# Patient Record
Sex: MALE | Race: ASIAN | NOT HISPANIC OR LATINO | Employment: OTHER | URBAN - METROPOLITAN AREA
[De-identification: names, ages, dates, MRNs, and addresses within clinical notes are randomized per-mention and may not be internally consistent; named-entity substitution may affect disease eponyms.]

---

## 2023-11-28 NOTE — TELEPHONE ENCOUNTER
Diagnosis, Referred by & from: Rectal Bleeding (Hematochezia)    Appt date: 12/15/2023   NOTES STATUS DETAILS   OFFICE NOTE from referring provider N/A    OFFICE NOTE from other specialist Care Everywhere [facility]:  3/22/23 - [facility] OV with Dr. Mcintyre   DISCHARGE SUMMARY from hospital N/A    DISCHARGE REPORT from the ER Care Everywhere [facility]:  11/24/23 - ED OV with Dr. Bah   OPERATIVE REPORT N/A    MEDICATION LIST Care Everywhere    LABS N/A    DIAGNOSTIC PROCEDURES N/A    IMAGING (DISC & REPORT)      CT Received [facility]:  11/25/23 - CT Abd/Pelvis     Records Requested  11/28/23    Facility  [facility]  Fax: [phone]   Outcome * 11/28/23 9:37 AM Faxed urgent request to Irene for images to be pushed to [facility]. - Sasha    * 11/29/23 6:51 AM Images received from [facility] and attached to the patient in PACs. - Sasha

## 2023-12-15 ENCOUNTER — OFFICE VISIT (OUTPATIENT)
Dept: SURGERY | Facility: CLINIC | Age: 68
End: 2023-12-15
Payer: MEDICARE

## 2023-12-15 ENCOUNTER — PRE VISIT (OUTPATIENT)
Dept: SURGERY | Facility: CLINIC | Age: 68
End: 2023-12-15

## 2023-12-15 VITALS — DIASTOLIC BLOOD PRESSURE: 77 MMHG | SYSTOLIC BLOOD PRESSURE: 125 MMHG | OXYGEN SATURATION: 99 % | HEART RATE: 67 BPM

## 2023-12-15 DIAGNOSIS — R19.5 DARK STOOLS: ICD-10-CM

## 2023-12-15 DIAGNOSIS — Z12.11 SCREEN FOR COLON CANCER: Primary | ICD-10-CM

## 2023-12-15 PROCEDURE — 99202 OFFICE O/P NEW SF 15 MIN: CPT

## 2023-12-15 RX ORDER — ASPIRIN 81 MG/1
81 TABLET, COATED ORAL DAILY
COMMUNITY

## 2023-12-15 RX ORDER — POTASSIUM CHLORIDE 1500 MG/1
20 TABLET, EXTENDED RELEASE ORAL DAILY
COMMUNITY

## 2023-12-15 RX ORDER — LISINOPRIL AND HYDROCHLOROTHIAZIDE 12.5; 2 MG/1; MG/1
1 TABLET ORAL DAILY
COMMUNITY

## 2023-12-15 RX ORDER — VERAPAMIL HYDROCHLORIDE 120 MG/1
TABLET, FILM COATED, EXTENDED RELEASE ORAL
COMMUNITY

## 2023-12-15 RX ORDER — LOVASTATIN 40 MG
40 TABLET ORAL
COMMUNITY
Start: 2023-05-02

## 2023-12-15 ASSESSMENT — PAIN SCALES - GENERAL: PAINLEVEL: NO PAIN (0)

## 2023-12-15 NOTE — PATIENT INSTRUCTIONS
You should get a call to schedule the colonoscopy. If you don t hear from a representative within 2 business days, please call [phone].     Start a daily fiber supplement such as Citrucel or Metamucil. Start with once a day. The powder version works best. These are commonly found at most stores over the counter.

## 2023-12-15 NOTE — PROGRESS NOTES
Colon and Rectal Surgery Consult Clinic Note    RE: Denia Pryor  : 1955  MANDO: 12/15/2023    Denia Pryor is a very pleasant 68 year old male here with concerns for melena.    Denia reports an episode of dark tarry stools the week after Thanksgiving. This happened for 1-2 days and concerned his son enough to bring him to the ED, where FOBT was negative. His son did not see these dark stools. His stools returned back to normal and he has not had any dark stools since then. No bright red blood per rectum. His baseline bowel movements are 2-3 times a day and he typically strains. Denies unintentional weight loss, loss of appetite, abdominal pain, change in bowel habits. No prior colonoscopy. No family history of colorectal cancer.    ong  declined; family interpreted.    Physical Examination:   /77 (BP Location: Left arm, Patient Position: Sitting, Cuff Size: Adult Large)   Pulse 67   SpO2 99%   General: alert, oriented, in no acute distress, sitting comfortably    Assessment/Plan: Denia Pryor is a 68 year old male with possible melena several weeks ago. No bright red blood per rectum. Discussed that there may be causes of dark stools that are not blood, such as iron supplements or Pepto-Bismol. It is reassuring that his FOBT was negative in the ED. We will plan for a colonoscopy for evaluate for other more proximal causes of bleeding. Patient's questions were answered to his stated satisfaction and he is in agreement with this plan.     Medical history:  No past medical history on file.    Surgical history:  No past surgical history on file.    Problem list:  There are no problems to display for this patient.      Medications:  Current Outpatient Medications   Medication Sig Dispense Refill    ASPIRIN LOW DOSE 81 MG EC tablet Take 81 mg by mouth daily      KLOR-CON 20 MEQ CR tablet Take 20 mEq by mouth daily      lisinopril-hydrochlorothiazide (ZESTORETIC) 20-12.5 MG tablet Take 1 tablet by mouth  daily      lovastatin (MEVACOR) 40 MG tablet Take 40 mg by mouth      verapamil ER (CALAN-SR) 120 MG CR tablet Take 1 Tablet (120 mg) by mouth every evening.*         Allergies:  No Known Allergies    Family history:  No family history on file.    Social history:  Social History     Tobacco Use    Smoking status: Never    Smokeless tobacco: Never   Substance Use Topics    Alcohol use: Not on file    Marital status: .    Nursing Notes:   Nelson Guardado, EMT  12/15/2023  3:37 PM  Signed  Chief Complaint   Patient presents with    Consult       Vitals:    12/15/23 1529   BP: 125/77   BP Location: Left arm   Patient Position: Sitting   Cuff Size: Adult Large   Pulse: 67   SpO2: 99%       There is no height or weight on file to calculate BMI.    Nelson Guardado EMT-P       15 minutes spent on the date of encounter performing chart review, history and exam, documentation and further activities as noted above.    ----------------------------------------------------  Di Simons PA-C  Colon and Rectal Surgery   [facility]

## 2023-12-15 NOTE — NURSING NOTE
Chief Complaint   Patient presents with    Consult       Vitals:    12/15/23 1529   BP: 125/77   BP Location: Left arm   Patient Position: Sitting   Cuff Size: Adult Large   Pulse: 67   SpO2: 99%       There is no height or weight on file to calculate BMI.    Nelson Guardado EMT-P

## 2023-12-15 NOTE — LETTER
12/15/2023       RE: Denia Pryor  [id number] [facility] [address] [id number]       Dear Colleague,    Thank you for referring your patient, Denia Pryor, to the [facility] [place] [facility]. Please see a copy of my visit note below.    Colon and Rectal Surgery Consult Clinic Note    RE: Denia Pryor  : 1955  MANDO: 12/15/2023    Denia Pryor is a very pleasant 68 year old male here with concerns for melena.    Denia reports an episode of dark tarry stools the week after Thanksgiving. This happened for 1-2 days and concerned his son enough to bring him to the ED, where FOBT was negative. His son did not see these dark stools. His stools returned back to normal and he has not had any dark stools since then. No bright red blood per rectum. His baseline bowel movements are 2-3 times a day and he typically strains. Denies unintentional weight loss, loss of appetite, abdominal pain, change in bowel habits. No prior colonoscopy. No family history of colorectal cancer.    [employer]  declined; family interpreted.    Physical Examination:   /77 (BP Location: Left arm, Patient Position: Sitting, Cuff Size: Adult Large)   Pulse 67   SpO2 99%   General: alert, oriented, in no acute distress, sitting comfortably    Assessment/Plan: Denia Pryor is a 68 year old male with possible melena several weeks ago. No bright red blood per rectum. Discussed that there may be causes of dark stools that are not blood, such as iron supplements or Pepto-Bismol. It is reassuring that his FOBT was negative in the ED. We will plan for a colonoscopy for evaluate for other more proximal causes of bleeding. Patient's questions were answered to his stated satisfaction and he is in agreement with this plan.     Medical history:  No past medical history on file.    Surgical history:  No past surgical history on file.    Problem list:  There are no problems to  display for this patient.      Medications:  Current Outpatient Medications   Medication Sig Dispense Refill    ASPIRIN LOW DOSE 81 MG EC tablet Take 81 mg by mouth daily      KLOR-CON 20 MEQ CR tablet Take 20 mEq by mouth daily      lisinopril-hydrochlorothiazide (ZESTORETIC) 20-12.5 MG tablet Take 1 tablet by mouth daily      lovastatin (MEVACOR) 40 MG tablet Take 40 mg by mouth      verapamil ER (CALAN-SR) 120 MG CR tablet Take 1 Tablet (120 mg) by mouth every evening.*         Allergies:  No Known Allergies    Family history:  No family history on file.    Social history:  Social History     Tobacco Use    Smoking status: Never    Smokeless tobacco: Never   Substance Use Topics    Alcohol use: Not on file    Marital status: .    Nursing Notes:   Nelson Guardado, EMT  12/15/2023  3:37 PM  Signed  Chief Complaint   Patient presents with    Consult       Vitals:    12/15/23 1529   BP: 125/77   BP Location: Left arm   Patient Position: Sitting   Cuff Size: Adult Large   Pulse: 67   SpO2: 99%       There is no height or weight on file to calculate BMI.    Nelson Guardado EMT-P       15 minutes spent on the date of encounter performing chart review, history and exam, documentation and further activities as noted above.    ----------------------------------------------------      Again, thank you for allowing me to participate in the care of your patient.      Sincerely,    Di Simons PA-C

## 2023-12-31 ENCOUNTER — HEALTH MAINTENANCE LETTER (OUTPATIENT)
Age: 68
End: 2023-12-31

## 2024-01-05 ENCOUNTER — HOSPITAL ENCOUNTER (OUTPATIENT)
Facility: AMBULATORY SURGERY CENTER | Age: 69
End: 2024-01-05
Attending: INTERNAL MEDICINE
Payer: MEDICARE

## 2024-01-05 ENCOUNTER — TELEPHONE (OUTPATIENT)
Dept: GASTROENTEROLOGY | Facility: CLINIC | Age: 69
End: 2024-01-05
Payer: MEDICARE

## 2024-01-05 NOTE — TELEPHONE ENCOUNTER
"Endoscopy Scheduling Screen    Have you had a positive Covid test in the last 14 days?  No    Are you active on MyChart?   Yes    What insurance is in the chart?  Other:  MEDICARE    Ordering/Referring Provider:     LETICIA STOLL      (If ordering provider performs procedure, schedule with ordering provider unless otherwise instructed. )    BMI: 32.06     Sedation Ordered  moderate sedation.   If patient BMI > 50 do not schedule in ASC.    If patient BMI > 45 do not schedule at [facility].    Are you taking methadone or Suboxone?  No    Are you taking any prescription medications for pain 3 or more times per week?   NO - No RN review required.    Do you have a history of malignant hyperthermia or adverse reaction to anesthesia?  No    (Females) Are you currently pregnant?   No     Have you been diagnosed or told you have pulmonary hypertension?   No    Do you have an LVAD?  No    Have you been told you have moderate to severe sleep apnea?  No    Have you been told you have COPD, asthma, or any other lung disease?  No    Do you have any heart conditions?  No     Have you ever had an organ transplant?   No    Have you ever had or are you awaiting a heart or lung transplant?   No    Have you had a stroke or transient ischemic attack (TIA aka \"mini stroke\" in the last 6 months?   No    Have you been diagnosed with or been told you have cirrhosis of the liver?   No    Are you currently on dialysis?   No    Do you need assistance transferring?   No    BMI: 32.06     Is patients BMI > 40 and scheduling location [facility]?  No    Do you take an injectable medication for weight loss or diabetes (excluding insulin)?  No    Do you take the medication Naltrexone?  No    Do you take blood thinners?  No       Prep   Are you currently on dialysis or do you have chronic kidney disease?  No    Do you have a diagnosis of diabetes?  No    Do you have a diagnosis of cystic fibrosis (CF)?  No    On a regular basis do you go 3 -5 days between bowel " movements?  No    BMI > 40?  No    Preferred Pharmacy:    [id number] - [place], [place] - [address]  [address] [place] [address]  Phone: [phone] Fax: [phone]      Final Scheduling Details   Colonoscopy prep sent?  Standard MiraLAX    Procedure scheduled  Colonoscopy    Surgeon:  ELYSSA     Date of procedure:  04/26/2024     Pre-OP / PAC:   No - Not required for this site.    Location  [facility] - ASC - Per order.    Sedation   MAC/Deep Sedation - Patient preference.      Patient Reminders:   You will receive a call from a Nurse to review instructions and health history.  This assessment must be completed prior to your procedure.  Failure to complete the Nurse assessment may result in the procedure being cancelled.      On the day of your procedure, please designate an adult(s) who can drive you home stay with you for the next 24 hours. The medicines used in the exam will make you sleepy. You will not be able to drive.      You cannot take public transportation, ride share services, or non-medical taxi service without a responsible caregiver.  Medical transport services are allowed with the requirement that a responsible caregiver will receive you at your destination.  We require that drivers and caregivers are confirmed prior to your procedure.

## 2024-04-11 ENCOUNTER — TELEPHONE (OUTPATIENT)
Dept: GASTROENTEROLOGY | Facility: CLINIC | Age: 69
End: 2024-04-11
Payer: MEDICARE

## 2024-04-11 DIAGNOSIS — Z12.11 SPECIAL SCREENING FOR MALIGNANT NEOPLASMS, COLON: Primary | ICD-10-CM

## 2024-04-11 NOTE — TELEPHONE ENCOUNTER
Pre visit planning completed.      Procedure details:    Patient scheduled for Colonoscopy  on 4/26/24.     Arrival time: 0830. Procedure time 0930    Facility location: [facility]; [address] [place] [address], 5th Floor, [place] [address]. Check in location: 5th Floor.    Sedation type: MAC    Pre op exam needed? N/A    Indication for procedure: Screening      Chart review:     Electronic implanted devices? No    Recent diagnosis of diverticulitis within the last 6 weeks? No    Diabetic? No      Medication review:    Anticoagulants? No    NSAIDS? No    Other medication HOLDING recommendations:  N/A      Prep for procedure:     Bowel prep recommendation: Standard Miralax  Due to: standard bowel prep.    Prep instructions sent via [employer]bite         Roxane Haines RN  Endoscopy Procedure Pre Assessment RN  [phone] option 4

## 2024-04-12 ENCOUNTER — APPOINTMENT (OUTPATIENT)
Dept: INTERPRETER SERVICES | Facility: CLINIC | Age: 69
End: 2024-04-12
Payer: MEDICARE

## 2024-04-12 NOTE — TELEPHONE ENCOUNTER
Attempted to contact patient in order to complete pre assessment questions.     [facility] with call.     No answer or VM on home phone and message on mobile stated that caller is not accepting calls at this time. Will send [employer] message to return call to [phone] option 4       Roxane Glasgow RN  Endoscopy Procedure Pre Assessment RN

## 2024-04-15 ENCOUNTER — PRE VISIT (OUTPATIENT)
Dept: UROLOGY | Facility: CLINIC | Age: 69
End: 2024-04-15
Payer: MEDICARE

## 2024-04-15 NOTE — TELEPHONE ENCOUNTER
Reason for visit: Incontinence      Relevant information: history of diabetes     Records/imaging/labs/orders: records available    Pt called: No need for a call    At Rooming: standard rooming    Radha Cramer CMA  4/15/2024  12:15 PM

## 2024-04-17 ENCOUNTER — APPOINTMENT (OUTPATIENT)
Dept: INTERPRETER SERVICES | Facility: CLINIC | Age: 69
End: 2024-04-17
Payer: MEDICARE

## 2024-04-17 NOTE — TELEPHONE ENCOUNTER
Called patient in attempts to complete pre assessment for upcoming Colonoscopy  scheduled on 4/26/24.     [employer]  assisted with call.     Patient requested that writer speak with Roxane, daughter in law and put family member on the phone. Per Roxane, English speaking, they will call back pre assessment team later today to review information as she states Denia's son is the one who will be assisting with helping patient prepare for upcoming procedure.     Writer will send callback required communication for return call required Second attempt.     NOTE: no consent to communicate on file but per Roxane they have access to [employer] messages     Roxane Glasgow RN  Endoscopy Procedure Pre Assessment RN

## 2024-04-25 RX ORDER — NALOXONE HYDROCHLORIDE 0.4 MG/ML
0.2 INJECTION, SOLUTION INTRAMUSCULAR; INTRAVENOUS; SUBCUTANEOUS
Status: CANCELLED | OUTPATIENT
Start: 2024-04-25

## 2024-04-25 RX ORDER — NALOXONE HYDROCHLORIDE 0.4 MG/ML
0.4 INJECTION, SOLUTION INTRAMUSCULAR; INTRAVENOUS; SUBCUTANEOUS
Status: CANCELLED | OUTPATIENT
Start: 2024-04-25

## 2024-04-25 RX ORDER — ONDANSETRON 4 MG/1
4 TABLET, ORALLY DISINTEGRATING ORAL EVERY 6 HOURS PRN
Status: CANCELLED | OUTPATIENT
Start: 2024-04-25

## 2024-04-25 RX ORDER — PROCHLORPERAZINE MALEATE 5 MG
5 TABLET ORAL EVERY 6 HOURS PRN
Status: CANCELLED | OUTPATIENT
Start: 2024-04-25

## 2024-04-25 RX ORDER — ONDANSETRON 2 MG/ML
4 INJECTION INTRAMUSCULAR; INTRAVENOUS
Status: CANCELLED | OUTPATIENT
Start: 2024-04-25

## 2024-04-25 RX ORDER — FLUMAZENIL 0.1 MG/ML
0.2 INJECTION, SOLUTION INTRAVENOUS
Status: CANCELLED | OUTPATIENT
Start: 2024-04-25 | End: 2024-04-26

## 2024-04-25 RX ORDER — ONDANSETRON 2 MG/ML
4 INJECTION INTRAMUSCULAR; INTRAVENOUS EVERY 6 HOURS PRN
Status: CANCELLED | OUTPATIENT
Start: 2024-04-25

## 2024-04-25 RX ORDER — LIDOCAINE 40 MG/G
CREAM TOPICAL
Status: CANCELLED | OUTPATIENT
Start: 2024-04-25

## 2024-04-26 ENCOUNTER — TELEPHONE (OUTPATIENT)
Dept: GASTROENTEROLOGY | Facility: CLINIC | Age: 69
End: 2024-04-26
Payer: MEDICARE

## 2024-04-26 NOTE — TELEPHONE ENCOUNTER
Patient's son calling, states does not know what time patient is supposed to be at his colonoscopy appointment.    Writer reviewed chart, noted patient was to check in at 0830.    Transferred to scheduling per request to reschedule.     Rae Vale RN  Endoscopy Procedure Pre Assessment RN  [phone] option 4

## 2024-04-26 NOTE — TELEPHONE ENCOUNTER
Caller: Chrissy Pryor      Reason for Reschedule/Cancellation   (please be detailed, any staff messages or encounters to note?): Patient missed appointment, son reached out to reschedule       Prior to reschedule please review:  Ordering Provider: Di Simons PA-C  Sedation Determined: MAC, pt preference  Does patient have any ASC Exclusions, please identify?: NO      Notes on Cancelled Procedure:  Procedure: Lower Endoscopy [Colonoscopy]   Date: 4/26  Location: [facility]; [address] [place] [address], [address] [place] [address]  Surgeon: ELYSSA      Rescheduled: Yes,   Procedure: Lower Endoscopy [Colonoscopy]    Date: 8/28   Location: [facility]; [address] [place] [address], [address] [place] [address]   Surgeon: YUN   Sedation Level Scheduled  MAC ,  Reason for Sedation Level PT PREFERENCE   Instructions updated and sent: MYCHART AND LETTER     Does patient need PAC or Pre -Op Rescheduled? : NO       Did you cancel or rescheduled an EUS procedure? No.

## 2024-08-20 RX ORDER — BISACODYL 5 MG/1
TABLET, DELAYED RELEASE ORAL
Qty: 4 TABLET | Refills: 0 | Status: SHIPPED | OUTPATIENT
Start: 2024-08-20

## 2024-08-20 NOTE — TELEPHONE ENCOUNTER
Attempted to contact patient in order to complete pre assessment questions.     [employer] [employer]  ID [id number]     called 3 times first 2 times patient answered and hung up the phone. On the 3rd try she spoke with patient and sounded like she introduced herself and then the Patient disconnected the call again    Callback required communication sent via [employer].      Roxane Haines RN  Endoscopy Procedure Pre Assessment

## 2024-08-20 NOTE — TELEPHONE ENCOUNTER
Rescheduled Colonoscopy    Pre visit planning completed.      Procedure details:    Patient scheduled for Colonoscopy on 8/28/24.     Arrival time: 1415. Procedure time 1515    Facility location: [facility]; [address] [place] [address], 5th Floor, [place] [address]. Check in location: 5th Floor.    Sedation type: MAC    Pre op exam needed? No.    Indication for procedure: Screening      Chart review:     Electronic implanted devices? No    Recent diagnosis of diverticulitis within the last 6 weeks? No      Medication review:    Diabetic? No    Anticoagulants? No    Weight loss medication/injectable? No.    NSAIDS? No    Other medication HOLDING recommendations:  N/A      Prep for procedure:     Bowel prep recommendation: Standard Golytely.     Bowel prep prescription sent to   [id number] - [place], [place] - [id number]  Due to: language barrier.     Prep instructions sent via [employer]         Roxane Haines RN  Endoscopy Procedure Pre Assessment RN  [phone] option 4

## 2024-08-22 NOTE — TELEPHONE ENCOUNTER
[place]  Sasha ID: [id number]     reached out to patient, who did answer phone. Patient states he is hard of hearing and would like to have instructions gone over by a family member, but no one is there at the moment.     RN did note PA was done previously with son Chrissy, per patient verbal consent given to RN to speak with son Chrissy regarding PA.      attempted to contact patients son, no answer. Left message to call back to go through PA. [phone] option #4.    Ceci Jackson RN  Endoscopy Procedure Pre Assessment RN

## 2024-08-26 ENCOUNTER — APPOINTMENT (OUTPATIENT)
Dept: INTERPRETER SERVICES | Facility: CLINIC | Age: 69
End: 2024-08-26
Payer: MEDICARE

## 2024-08-28 ENCOUNTER — ANESTHESIA EVENT (OUTPATIENT)
Dept: SURGERY | Facility: AMBULATORY SURGERY CENTER | Age: 69
End: 2024-08-28
Payer: MEDICARE

## 2024-08-28 ENCOUNTER — ANESTHESIA (OUTPATIENT)
Dept: SURGERY | Facility: AMBULATORY SURGERY CENTER | Age: 69
End: 2024-08-28
Payer: MEDICARE

## 2024-08-28 ENCOUNTER — HOSPITAL ENCOUNTER (OUTPATIENT)
Facility: AMBULATORY SURGERY CENTER | Age: 69
Discharge: HOME | End: 2024-08-28
Attending: INTERNAL MEDICINE
Payer: MEDICARE

## 2024-08-28 VITALS
DIASTOLIC BLOOD PRESSURE: 76 MMHG | HEIGHT: 65 IN | SYSTOLIC BLOOD PRESSURE: 112 MMHG | RESPIRATION RATE: 16 BRPM | WEIGHT: 185 LBS | BODY MASS INDEX: 30.82 KG/M2 | HEART RATE: 70 BPM | TEMPERATURE: 97 F | OXYGEN SATURATION: 95 %

## 2024-08-28 VITALS — HEART RATE: 81 BPM

## 2024-08-28 PROBLEM — I70.299 ATHEROSCLEROSIS OF ARTERY OF EXTREMITY WITH ULCERATION (H): Status: ACTIVE | Noted: 2021-03-01

## 2024-08-28 PROBLEM — Z91.81 AT HIGH RISK FOR FALLS: Status: ACTIVE | Noted: 2023-03-23

## 2024-08-28 PROBLEM — Z86.718 HISTORY OF DVT OF LOWER EXTREMITY: Status: ACTIVE | Noted: 2023-03-22

## 2024-08-28 PROBLEM — Z86.73 HISTORY OF STROKE: Status: ACTIVE | Noted: 2023-03-22

## 2024-08-28 PROBLEM — H91.93 BILATERAL HEARING LOSS: Status: ACTIVE | Noted: 2023-03-22

## 2024-08-28 PROBLEM — H11.051: Status: ACTIVE | Noted: 2023-03-23

## 2024-08-28 PROBLEM — E87.6 HYPOKALEMIA: Status: ACTIVE | Noted: 2023-03-22

## 2024-08-28 PROBLEM — E78.2 MIXED HYPERLIPIDEMIA: Status: ACTIVE | Noted: 2023-03-22

## 2024-08-28 PROBLEM — L97.909 ATHEROSCLEROSIS OF ARTERY OF EXTREMITY WITH ULCERATION (H): Status: ACTIVE | Noted: 2021-03-01

## 2024-08-28 PROBLEM — D22.9 ATYPICAL MOLE: Status: ACTIVE | Noted: 2017-10-21

## 2024-08-28 LAB
COLONOSCOPY: NORMAL
GLUCOSE BLDC GLUCOMTR-MCNC: 100 MG/DL (ref 70–99)

## 2024-08-28 PROCEDURE — 45380 COLONOSCOPY AND BIOPSY: CPT | Mod: PT | Performed by: INTERNAL MEDICINE

## 2024-08-28 PROCEDURE — 82962 GLUCOSE BLOOD TEST: CPT | Performed by: PATHOLOGY

## 2024-08-28 PROCEDURE — 45380 COLONOSCOPY AND BIOPSY: CPT | Performed by: ANESTHESIOLOGY

## 2024-08-28 PROCEDURE — 88305 TISSUE EXAM BY PATHOLOGIST: CPT | Mod: 26 | Performed by: PATHOLOGY

## 2024-08-28 PROCEDURE — 45380 COLONOSCOPY AND BIOPSY: CPT | Performed by: NURSE ANESTHETIST, CERTIFIED REGISTERED

## 2024-08-28 PROCEDURE — 88305 TISSUE EXAM BY PATHOLOGIST: CPT | Mod: TC | Performed by: INTERNAL MEDICINE

## 2024-08-28 RX ORDER — ONDANSETRON 2 MG/ML
4 INJECTION INTRAMUSCULAR; INTRAVENOUS EVERY 6 HOURS PRN
Status: DISCONTINUED | OUTPATIENT
Start: 2024-08-28 | End: 2024-08-29 | Stop reason: HOSPADM

## 2024-08-28 RX ORDER — ALLOPURINOL 300 MG/1
300 TABLET ORAL DAILY
COMMUNITY

## 2024-08-28 RX ORDER — NALOXONE HYDROCHLORIDE 0.4 MG/ML
0.2 INJECTION, SOLUTION INTRAMUSCULAR; INTRAVENOUS; SUBCUTANEOUS
Status: DISCONTINUED | OUTPATIENT
Start: 2024-08-28 | End: 2024-08-29 | Stop reason: HOSPADM

## 2024-08-28 RX ORDER — NALOXONE HYDROCHLORIDE 0.4 MG/ML
0.4 INJECTION, SOLUTION INTRAMUSCULAR; INTRAVENOUS; SUBCUTANEOUS
Status: DISCONTINUED | OUTPATIENT
Start: 2024-08-28 | End: 2024-08-29 | Stop reason: HOSPADM

## 2024-08-28 RX ORDER — METFORMIN HCL 500 MG
TABLET, EXTENDED RELEASE 24 HR ORAL
COMMUNITY

## 2024-08-28 RX ORDER — BLOOD SUGAR DIAGNOSTIC
STRIP MISCELLANEOUS
COMMUNITY
Start: 2024-07-02

## 2024-08-28 RX ORDER — SODIUM CHLORIDE, SODIUM LACTATE, POTASSIUM CHLORIDE, CALCIUM CHLORIDE 600; 310; 30; 20 MG/100ML; MG/100ML; MG/100ML; MG/100ML
INJECTION, SOLUTION INTRAVENOUS CONTINUOUS
Status: DISCONTINUED | OUTPATIENT
Start: 2024-08-28 | End: 2024-08-28 | Stop reason: HOSPADM

## 2024-08-28 RX ORDER — LIDOCAINE 40 MG/G
CREAM TOPICAL
Status: DISCONTINUED | OUTPATIENT
Start: 2024-08-28 | End: 2024-08-28 | Stop reason: HOSPADM

## 2024-08-28 RX ORDER — PROPOFOL 10 MG/ML
INJECTION, EMULSION INTRAVENOUS CONTINUOUS PRN
Status: DISCONTINUED | OUTPATIENT
Start: 2024-08-28 | End: 2024-08-28

## 2024-08-28 RX ORDER — BLOOD-GLUCOSE METER
EACH MISCELLANEOUS
COMMUNITY
Start: 2024-05-09

## 2024-08-28 RX ORDER — ONDANSETRON 2 MG/ML
4 INJECTION INTRAMUSCULAR; INTRAVENOUS
Status: DISCONTINUED | OUTPATIENT
Start: 2024-08-28 | End: 2024-08-28 | Stop reason: HOSPADM

## 2024-08-28 RX ORDER — LIDOCAINE HYDROCHLORIDE 20 MG/ML
INJECTION, SOLUTION INFILTRATION; PERINEURAL PRN
Status: DISCONTINUED | OUTPATIENT
Start: 2024-08-28 | End: 2024-08-28

## 2024-08-28 RX ORDER — PROCHLORPERAZINE MALEATE 5 MG
5 TABLET ORAL EVERY 6 HOURS PRN
Status: DISCONTINUED | OUTPATIENT
Start: 2024-08-28 | End: 2024-08-29 | Stop reason: HOSPADM

## 2024-08-28 RX ORDER — LANCETS
EACH MISCELLANEOUS
COMMUNITY
Start: 2024-08-20

## 2024-08-28 RX ORDER — ONDANSETRON 4 MG/1
4 TABLET, ORALLY DISINTEGRATING ORAL EVERY 6 HOURS PRN
Status: DISCONTINUED | OUTPATIENT
Start: 2024-08-28 | End: 2024-08-29 | Stop reason: HOSPADM

## 2024-08-28 RX ORDER — PROPOFOL 10 MG/ML
INJECTION, EMULSION INTRAVENOUS PRN
Status: DISCONTINUED | OUTPATIENT
Start: 2024-08-28 | End: 2024-08-28

## 2024-08-28 RX ORDER — FLUMAZENIL 0.1 MG/ML
0.2 INJECTION, SOLUTION INTRAVENOUS
Status: DISCONTINUED | OUTPATIENT
Start: 2024-08-28 | End: 2024-08-29 | Stop reason: HOSPADM

## 2024-08-28 RX ORDER — SODIUM CHLORIDE, SODIUM LACTATE, POTASSIUM CHLORIDE, CALCIUM CHLORIDE 600; 310; 30; 20 MG/100ML; MG/100ML; MG/100ML; MG/100ML
INJECTION, SOLUTION INTRAVENOUS CONTINUOUS PRN
Status: DISCONTINUED | OUTPATIENT
Start: 2024-08-28 | End: 2024-08-28

## 2024-08-28 RX ADMIN — LIDOCAINE HYDROCHLORIDE 80 MG: 20 INJECTION, SOLUTION INFILTRATION; PERINEURAL at 15:20

## 2024-08-28 RX ADMIN — PROPOFOL 150 MCG/KG/MIN: 10 INJECTION, EMULSION INTRAVENOUS at 15:20

## 2024-08-28 RX ADMIN — SODIUM CHLORIDE, SODIUM LACTATE, POTASSIUM CHLORIDE, CALCIUM CHLORIDE: 600; 310; 30; 20 INJECTION, SOLUTION INTRAVENOUS at 15:16

## 2024-08-28 RX ADMIN — PROPOFOL 70 MG: 10 INJECTION, EMULSION INTRAVENOUS at 15:20

## 2024-08-28 NOTE — ANESTHESIA CARE TRANSFER NOTE
Patient: Denia Pryor    Procedure: Procedure(s):  COLONOSCOPY, WITH POLYPECTOMY       Diagnosis: Screen for colon cancer [Z12.11]  Diagnosis Additional Information: No value filed.    Anesthesia Type:   MAC     Note:    Oropharynx: oropharynx clear of all foreign objects and spontaneously breathing  Level of Consciousness: awake  Oxygen Supplementation: room air    Independent Airway: airway patency satisfactory and stable  Dentition: dentition unchanged  Vital Signs Stable: post-procedure vital signs reviewed and stable  Report to RN Given: handoff report given  Patient transferred to: Phase II  Comments: Refer to RN flowsheet for post-operative vital signs.   Handoff Report: Identifed the Patient, Identified the Reponsible Provider, Reviewed the pertinent medical history, Discussed the surgical course, Reviewed Intra-OP anesthesia mangement and issues during anesthesia, Set expectations for post-procedure period and Allowed opportunity for questions and acknowledgement of understanding      Vitals:  Vitals Value Taken Time   BP     Temp     Pulse     Resp     SpO2         Electronically Signed By: REI Armendariz CRNA  August 28, 2024  3:52 PM

## 2024-08-28 NOTE — ANESTHESIA POSTPROCEDURE EVALUATION
Patient: Denia Pryor    Procedure: Procedure(s):  COLONOSCOPY, WITH POLYPECTOMY       Anesthesia Type:  MAC    Note:  Disposition: Outpatient   Postop Pain Control: Uneventful            Sign Out: Well controlled pain   PONV: No   Neuro/Psych: Uneventful            Sign Out: Acceptable/Baseline neuro status   Airway/Respiratory: Uneventful            Sign Out: Acceptable/Baseline resp. status   CV/Hemodynamics: Uneventful            Sign Out: Acceptable CV status; No obvious hypovolemia; No obvious fluid overload   Other NRE: NONE   DID A NON-ROUTINE EVENT OCCUR?            Last vitals:  Vitals Value Taken Time   /75 08/28/24 1550   Temp     Pulse 80 08/28/24 1550   Resp 15 08/28/24 1550   SpO2 95 % 08/28/24 1550       Electronically Signed By: Luc Oneal MD  August 28, 2024  4:25 PM

## 2024-08-28 NOTE — ANESTHESIA PREPROCEDURE EVALUATION
"Anesthesia Pre-Procedure Evaluation    Patient: Denia Pryor   MRN: [id number] : 1955        Procedure : Procedure(s):  Colonoscopy          History reviewed. No pertinent past medical history.   History reviewed. No pertinent surgical history.   No Known Allergies   Social History     Tobacco Use    Smoking status: Never    Smokeless tobacco: Never   Substance Use Topics    Alcohol use: Not on file      Wt Readings from Last 1 Encounters:   24 83.9 kg (185 lb)        Anesthesia Evaluation   Pt has not had prior anesthetic         ROS/MED HX  ENT/Pulmonary:  - neg pulmonary ROS     Neurologic:     (+)          CVA, date: , with deficits, - visual field deficit.                   Cardiovascular:     (+) Dyslipidemia hypertension- -   -  - -                                      METS/Exercise Tolerance:     Hematologic:     (+) History of blood clots,    pt is not anticoagulated,           Musculoskeletal:   (+)  arthritis,             GI/Hepatic:       Renal/Genitourinary:  - neg Renal ROS     Endo:     (+)  type II DM,   Not using insulin, - not using insulin pump. Normal glucose range: 110's, not previously admitted for DM/DKA.              Psychiatric/Substance Use:  - neg psychiatric ROS     Infectious Disease:  - neg infectious disease ROS     Malignancy:  - neg malignancy ROS     Other:  - neg other ROS          Physical Exam    Airway        Mallampati: II   TM distance: > 3 FB   Neck ROM: full   Mouth opening: > 3 cm    Respiratory Devices and Support         Dental       (+) Edentulous      Cardiovascular   cardiovascular exam normal       Rhythm and rate: regular and normal     Pulmonary   pulmonary exam normal        breath sounds clear to auscultation       OUTSIDE LABS:  CBC: No results found for: \"WBC\", \"HGB\", \"HCT\", \"PLT\"  BMP: No results found for: \"NA\", \"POTASSIUM\", \"CHLORIDE\", \"CO2\", \"BUN\", \"CR\", \"GLC\"  COAGS: No results found for: \"PTT\", \"INR\", \"FIBR\"  POC: No results found for: \"BGM\", " "\"HCG\", \"HCGS\"  HEPATIC: No results found for: \"ALBUMIN\", \"PROTTOTAL\", \"ALT\", \"AST\", \"GGT\", \"ALKPHOS\", \"BILITOTAL\", \"BILIDIRECT\", \"AKASH\"  OTHER: No results found for: \"PH\", \"LACT\", \"A1C\", \"DAVID\", \"PHOS\", \"MAG\", \"LIPASE\", \"AMYLASE\", \"TSH\", \"T4\", \"T3\", \"CRP\", \"SED\"    Anesthesia Plan    ASA Status:  3    NPO Status:  NPO Appropriate    Anesthesia Type: MAC.     - Reason for MAC: straight local not clinically adequate, immobility needed              Consents    Anesthesia Plan(s) and associated risks, benefits, and realistic alternatives discussed. Questions answered and patient/representative(s) expressed understanding.     - Discussed:     - Discussed with:  Patient,             Postoperative Care    Pain management: Multi-modal analgesia.   PONV prophylaxis: Ondansetron (or other 5HT-3), Background Propofol Infusion     Comments:               Nova Mcmahon MD    I have reviewed the pertinent notes and labs in the chart from the past 30 days and (re)examined the patient.  Any updates or changes from those notes are reflected in this note.             # Drug Induced Platelet Defect: home medication list includes an antiplatelet medication  # Obesity: Estimated body mass index is 30.79 kg/m  as calculated from the following:    Height as of this encounter: 1.651 m (5' 5\").    Weight as of this encounter: 83.9 kg (185 lb).      "

## 2024-08-28 NOTE — DISCHARGE INSTRUCTIONS
Discharge Instructions after Colonoscopy  or Sigmoidoscopy    Today you had a Colonoscopy:    Activity and Diet  You were given medicine for pain. You may be dizzy or sleepy.  For 24 hours:   Do not drive or use heavy equipment.   Do not make important decisions.   Do not drink any alcohol.  You may return to your normal diet and medicines.    Discomfort   Air was placed in your colon during the exam in order to see it. Walking helps to pass the air.   You may take Tylenol (acetaminophen) for pain unless your doctor has told you not to.  Do not take aspirin or ibuprofen (Advil, Motrin, or other anti-inflammatory  drugs) for _____ days.    Follow-up  ____ We took small tissue samples or polyps to study. Your doctor will call you with the results  within two weeks.    When to call:    Call right away if you have:   Unusual pain in belly or chest pain not relieved with passing air.   More than 1 to 2 Tablespoons of bleeding from your rectum.   Fever above 100.6  F (37.5  C).    If you have severe pain, bleeding, or shortness of breath, go to an emergency room.    If you have questions, call:  Monday to Friday, 8 a.m. to 4:30 p.m.  Central Scheduling Department: [phone]    After hours  Hospital: [phone] (Ask for the GI fellow on call)

## 2024-08-28 NOTE — H&P
Denia Pryor  [id number]  male  69 year old      Reason for procedure/surgery: screening    There is no problem list on file for this patient.      Past Surgical History:  No past surgical history on file.    Past Medical History: No past medical history on file.    Social History:   Social History     Tobacco Use    Smoking status: Never    Smokeless tobacco: Never   Substance Use Topics    Alcohol use: Not on file       Family History: No family history on file.    Allergies: No Known Allergies    Active Medications:   Current Outpatient Medications   Medication Sig Dispense Refill    ASPIRIN LOW DOSE 81 MG EC tablet Take 81 mg by mouth daily      bisacodyl (DULCOLAX) 5 MG EC tablet Take 2 tablets at 3 pm the day before your procedure. If your procedure is before 11 am, take 2 additional tablets at 11 pm. If your procedure is after 11 am, take 2 additional tablets at 6 am. For additional instructions refer to your colonoscopy prep instructions. 4 tablet 0    KLOR-CON 20 MEQ CR tablet Take 20 mEq by mouth daily      lisinopril-hydrochlorothiazide (ZESTORETIC) 20-12.5 MG tablet Take 1 tablet by mouth daily      lovastatin (MEVACOR) 40 MG tablet Take 40 mg by mouth      polyethylene glycol (GOLYTELY) 236 g suspension The night before the exam at 6 pm drink an 8-ounce glass every 15 minutes until the jug is half empty. If you arrive before 11 AM: Drink the other half of the Golytely jug at 11 PM night before procedure. If you arrive after 11 AM: Drink the other half of the Golytely jug at 6 AM day of procedure. For additional instructions refer to your colonoscopy prep instructions. 4000 mL 0    verapamil ER (CALAN-SR) 120 MG CR tablet Take 1 Tablet (120 mg) by mouth every evening.*         Systemic Review:   CONSTITUTIONAL: NEGATIVE for fever, chills, change in weight  ENT/MOUTH: NEGATIVE for ear, mouth and throat problems  RESP: NEGATIVE for significant cough or SOB  CV: NEGATIVE for chest pain, palpitations or  peripheral edema    Physical Examination:   Vital Signs: There were no vitals taken for this visit.  GENERAL: healthy, alert and no distress  NECK: no adenopathy, no asymmetry, masses, or scars  RESP: lungs clear to auscultation - no rales, rhonchi or wheezes  CV: regular rate and rhythm, normal S1 S2, no S3 or S4, no murmur, click or rub, no peripheral edema and peripheral pulses strong  ABDOMEN: soft, nontender, no hepatosplenomegaly, no masses and bowel sounds normal  MS: no gross musculoskeletal defects noted, no edema    Plan: Appropriate to proceed as scheduled.      Opal Solitario MD  8/27/2024    PCP:  Gemini Mcintyre

## 2024-08-29 LAB
PATH REPORT.COMMENTS IMP SPEC: NORMAL
PATH REPORT.COMMENTS IMP SPEC: NORMAL
PATH REPORT.FINAL DX SPEC: NORMAL
PATH REPORT.GROSS SPEC: NORMAL
PATH REPORT.MICROSCOPIC SPEC OTHER STN: NORMAL
PATH REPORT.RELEVANT HX SPEC: NORMAL
PHOTO IMAGE: NORMAL

## 2025-01-19 ENCOUNTER — HEALTH MAINTENANCE LETTER (OUTPATIENT)
Age: 70
End: 2025-01-19

## (undated) DEVICE — KIT ENDO FIRST STEP DISINFECTANT 200ML W/POUCH EP-4

## (undated) DEVICE — TUBING SUCTION MEDI-VAC 1/4"X20' N620A

## (undated) DEVICE — SPECIMEN CONTAINER 3OZ W/FORMALIN 59901

## (undated) DEVICE — GOWN IMPERVIOUS 2XL BLUE

## (undated) DEVICE — SUCTION MANIFOLD NEPTUNE 2 SYS 1 PORT 702-025-000

## (undated) DEVICE — SOL WATER IRRIG 500ML BOTTLE 2F7113

## (undated) DEVICE — KIT ENDO TURNOVER/PROCEDURE CARRY-ON 101822